# Patient Record
Sex: FEMALE | Race: WHITE | NOT HISPANIC OR LATINO | Employment: UNEMPLOYED | URBAN - METROPOLITAN AREA
[De-identification: names, ages, dates, MRNs, and addresses within clinical notes are randomized per-mention and may not be internally consistent; named-entity substitution may affect disease eponyms.]

---

## 2020-09-03 ENCOUNTER — OFFICE VISIT (OUTPATIENT)
Dept: OBSTETRICS AND GYNECOLOGY | Age: 40
End: 2020-09-03

## 2020-09-03 VITALS
BODY MASS INDEX: 50.02 KG/M2 | SYSTOLIC BLOOD PRESSURE: 126 MMHG | DIASTOLIC BLOOD PRESSURE: 82 MMHG | TEMPERATURE: 97.5 F | HEIGHT: 64 IN | WEIGHT: 293 LBS

## 2020-09-03 DIAGNOSIS — Z01.419 WELL WOMAN EXAM WITH ROUTINE GYNECOLOGICAL EXAM: Primary | ICD-10-CM

## 2020-09-03 DIAGNOSIS — N63.20 LEFT BREAST LUMP: ICD-10-CM

## 2020-09-03 PROCEDURE — 99386 PREV VISIT NEW AGE 40-64: CPT | Performed by: NURSE PRACTITIONER

## 2020-09-03 PROCEDURE — 99213 OFFICE O/P EST LOW 20 MIN: CPT | Performed by: NURSE PRACTITIONER

## 2020-09-03 RX ORDER — ALBUTEROL SULFATE 90 UG/1
2 AEROSOL, METERED RESPIRATORY (INHALATION) EVERY 4 HOURS PRN
COMMUNITY
End: 2020-09-04 | Stop reason: SDUPTHER

## 2020-09-03 RX ORDER — DICYCLOMINE HCL 20 MG
20 TABLET ORAL 2 TIMES DAILY
COMMUNITY
End: 2020-09-04 | Stop reason: SDUPTHER

## 2020-09-03 RX ORDER — SERTRALINE HYDROCHLORIDE 25 MG/1
25 TABLET, FILM COATED ORAL DAILY
COMMUNITY
End: 2020-09-04 | Stop reason: SDUPTHER

## 2020-09-03 RX ORDER — FLUCONAZOLE 150 MG/1
150 TABLET ORAL ONCE
Qty: 2 TABLET | Refills: 0 | Status: SHIPPED | OUTPATIENT
Start: 2020-09-03 | End: 2020-09-04

## 2020-09-03 RX ORDER — TOPIRAMATE 25 MG/1
25 CAPSULE, COATED PELLETS ORAL 2 TIMES DAILY
COMMUNITY
End: 2020-09-04 | Stop reason: SDUPTHER

## 2020-09-03 RX ORDER — LORATADINE 10 MG/1
CAPSULE, LIQUID FILLED ORAL
COMMUNITY
End: 2020-09-03

## 2020-09-03 RX ORDER — PANTOPRAZOLE SODIUM 20 MG/1
20 TABLET, DELAYED RELEASE ORAL DAILY
COMMUNITY
End: 2020-09-03

## 2020-09-03 RX ORDER — TRAZODONE HYDROCHLORIDE 100 MG/1
100 TABLET ORAL NIGHTLY
COMMUNITY
End: 2020-09-04 | Stop reason: SDUPTHER

## 2020-09-03 RX ORDER — ACETAMINOPHEN 325 MG/1
650 TABLET ORAL EVERY 6 HOURS PRN
COMMUNITY
End: 2020-09-03

## 2020-09-03 RX ORDER — SUMATRIPTAN 50 MG/1
50 TABLET, FILM COATED ORAL
COMMUNITY
End: 2020-09-04 | Stop reason: SDUPTHER

## 2020-09-03 NOTE — PROGRESS NOTES
[facility]  Routine Annual Visit    9/3/2020    Patient: Irish Garza          MR#:[id number]      History of Present Illness    40 y.o. female  who presents for annual exam as a new patient.    She reports very sporadic past GYN care. States last pap at least 2-3 years ago.  She denies hx of abnormals or significant GYN history other than 3 .  She denies any significant medical hx. Does struggle with IBS, headaches, obesity  Irish is using mirena for contraception- inserted last year she reports.  She is amenorrheic on the device and doing well    Irish complains today of intermittent left breast lump x years. She actually cannot locate today.   Denies skin changes, swelling, nipple changes/discharge bilaterally. No right breast lumps.    No other complaints today  She does desire comprehensive STI screening today.    Irish states she needs to get established with primary care. This was HIGHLY encouraged. Contact information for Dr. Kehrer's office given. She plans to call.   Advised she needs full physical and fasting blood work  Weight loss highly encouraged.      No LMP recorded. Patient has had an implant.  Obstetric History:  OB History        3    Para   3    Term   3            AB        Living   3       SAB        TAB        Ectopic        Molar        Multiple        Live Births   3               Menstrual History:     No LMP recorded. Patient has had an implant.       Sexual History:       ________________________________________  There is no problem list on file for this patient.      Past Medical History:   Diagnosis Date   • Anxiety    • Herpes    • IBS (irritable bowel syndrome)    • Major depressive disorder        Past Surgical History:   Procedure Laterality Date   • COLONOSCOPY     • ENDOSCOPY         Social History     Tobacco Use   Smoking Status Former Smoker   Smokeless Tobacco Never Used       Family History   Problem Relation Age of Onset   •  Thyroid disease Mother    • ADD / ADHD Brother    • Thyroid disease Sister    • Breast cancer Maternal Grandmother    • Lung cancer Maternal Grandmother        Prior to Admission medications    Medication Sig Start Date End Date Taking? Authorizing Provider   dicyclomine (BENTYL) 20 MG tablet Take 20 mg by mouth 2 (two) times a day.   Yes Sameera Cifuentes MD   sertraline (ZOLOFT) 25 MG tablet Take 25 mg by mouth Daily. TAKE 5 TABLETS BY MOUTH AT BEDTIME   Yes Sameera Cifuentes MD   acetaminophen (TYLENOL) 325 MG tablet Take 650 mg by mouth Every 6 (Six) Hours As Needed for Mild Pain .    Sameera Cifuentes MD   albuterol sulfate  (90 Base) MCG/ACT inhaler Inhale 2 puffs Every 4 (Four) Hours As Needed for Wheezing.    Sameera Cifuentes MD   Loratadine 10 MG capsule Take  by mouth.    Sameera Cifuentes MD   pantoprazole (PROTONIX) 20 MG EC tablet Take 20 mg by mouth Daily.    Sameera Cifuentes MD   psyllium (METAMUCIL) 58.6 % packet Take 1 packet by mouth Daily.    Sameera Cifuentes MD   SUMAtriptan (IMITREX) 50 MG tablet Take 50 mg by mouth Every 2 (Two) Hours As Needed for Migraine. Take one tablet at onset of headache. May repeat dose one time in 2 hours if headache not relieved.    Sameera Cifuentes MD   topiramate (TOPAMAX) 25 MG capsule (sprinkle) Take 25 mg by mouth 2 (Two) Times a Day.    Sameera Cifuentes MD   traZODone (DESYREL) 100 MG tablet Take 100 mg by mouth Every Night.    Sameera Cifuentes MD     ________________________________________    The following portions of the patient's history were reviewed and updated as appropriate: allergies, current medications, past family history, past medical history, past social history, past surgical history and problem list.    Review of Systems   Constitutional: Negative.    HENT: Negative.    Eyes: Negative for visual disturbance.   Respiratory: Negative for cough, shortness of breath and wheezing.    Cardiovascular:  "Negative for chest pain, palpitations and leg swelling.   Gastrointestinal: Negative for abdominal distention, abdominal pain, blood in stool, constipation, diarrhea, nausea and vomiting.   Endocrine: Negative for cold intolerance and heat intolerance.   Genitourinary: Negative for difficulty urinating, dyspareunia, dysuria, frequency, genital sores, hematuria, menstrual problem, pelvic pain, urgency, vaginal bleeding, vaginal discharge and vaginal pain.   Musculoskeletal: Negative.    Skin: Negative.    Neurological: Negative for dizziness, weakness, light-headedness, numbness and headaches.   Hematological: Negative.    Psychiatric/Behavioral: Negative.    Breasts- see HPI    Objective   Physical Exam    /82   Temp 97.5 °F (36.4 °C)   Ht 162.6 cm (64\")   Wt (!) 143 kg (315 lb 12.8 oz)   Breastfeeding No   BMI 54.21 kg/m²    BP Readings from Last 3 Encounters:   09/03/20 126/82      Wt Readings from Last 3 Encounters:   09/03/20 (!) 143 kg (315 lb 12.8 oz)        BMI: Estimated body mass index is 54.21 kg/m² as calculated from the following:    Height as of this encounter: 162.6 cm (64\").    Weight as of this encounter: 143 kg (315 lb 12.8 oz).          General:   alert, appears stated age and cooperative   Heart: regular rate and rhythm, S1, S2 normal, no murmur, click, rub or gallop   Lungs: clear to auscultation bilaterally   Abdomen: soft, non-tender, without masses or organomegaly   Breast: inspection negative, no nipple discharge or bleeding, no masses or nodularity palpable   Vulva: normal   Vagina: normal mucosa, normal discharge   Cervix: no cervical motion tenderness and no lesions   Uterus: normal size, mobile, non-tender, normal shape and consistency or exam is limited habitus    Adnexa: exam limited by habitus     Assessment:    1. Normal annual exam   2. Breast lump- diagnostic mammogram ordered and scheduled for patient prior to leaving office. She is highly encouraged to follow through " with this and now begin yearly screenings as 39 y/o. Follow up if breast symptoms persist even despite negative imaging.  3.  Counseled on healthy lifestyle modifications, safe sex, condom use, and self breast exams.    All of the patient's questions were addressed and answered, I have encouraged her to call for today's test results if she has not received them within 10 days.  Patient is advised to call with any change in her condition or with any other questions, otherwise return in 12 months for annual examination.      Plan:    []  Rx:   [x]  Mammogram request made  [x]  PAP done  []  Occult fecal blood test ([facility])  []  Labs:   [x]  GC/Chl/TV  []  DEXA scan   []  Referral for colonoscopy:           CHANNING Narayan  9/3/2020 16:19

## 2020-09-04 ENCOUNTER — OFFICE VISIT (OUTPATIENT)
Dept: FAMILY MEDICINE CLINIC | Facility: CLINIC | Age: 40
End: 2020-09-04

## 2020-09-04 VITALS
TEMPERATURE: 96.9 F | DIASTOLIC BLOOD PRESSURE: 74 MMHG | HEIGHT: 64 IN | SYSTOLIC BLOOD PRESSURE: 128 MMHG | BODY MASS INDEX: 50.02 KG/M2 | HEART RATE: 84 BPM | OXYGEN SATURATION: 99 % | WEIGHT: 293 LBS

## 2020-09-04 DIAGNOSIS — F41.9 ANXIETY: ICD-10-CM

## 2020-09-04 DIAGNOSIS — F51.01 PRIMARY INSOMNIA: ICD-10-CM

## 2020-09-04 DIAGNOSIS — F33.1 MODERATE EPISODE OF RECURRENT MAJOR DEPRESSIVE DISORDER (HCC): ICD-10-CM

## 2020-09-04 DIAGNOSIS — G43.009 MIGRAINE WITHOUT AURA AND WITHOUT STATUS MIGRAINOSUS, NOT INTRACTABLE: ICD-10-CM

## 2020-09-04 DIAGNOSIS — K58.2 IRRITABLE BOWEL SYNDROME WITH BOTH CONSTIPATION AND DIARRHEA: Primary | ICD-10-CM

## 2020-09-04 DIAGNOSIS — E66.01 CLASS 3 SEVERE OBESITY WITH SERIOUS COMORBIDITY AND BODY MASS INDEX (BMI) OF 50.0 TO 59.9 IN ADULT, UNSPECIFIED OBESITY TYPE (HCC): ICD-10-CM

## 2020-09-04 DIAGNOSIS — R10.11 RIGHT UPPER QUADRANT ABDOMINAL PAIN: ICD-10-CM

## 2020-09-04 LAB
HBV SURFACE AG SERPL QL IA: NEGATIVE
HCV AB S/CO SERPL IA: <0.1 S/CO RATIO (ref 0–0.9)
HIV 1+2 AB+HIV1 P24 AG SERPL QL IA: NON REACTIVE
RPR SER QL: NON REACTIVE

## 2020-09-04 PROCEDURE — 99203 OFFICE O/P NEW LOW 30 MIN: CPT | Performed by: NURSE PRACTITIONER

## 2020-09-04 RX ORDER — SERTRALINE HYDROCHLORIDE 25 MG/1
TABLET, FILM COATED ORAL
Qty: 150 TABLET | Refills: 2 | Status: SHIPPED | OUTPATIENT
Start: 2020-09-04 | End: 2020-09-04

## 2020-09-04 RX ORDER — TOPIRAMATE 25 MG/1
CAPSULE, COATED PELLETS ORAL
Qty: 120 CAPSULE | Refills: 0 | Status: SHIPPED | OUTPATIENT
Start: 2020-09-04 | End: 2020-10-22

## 2020-09-04 RX ORDER — DICYCLOMINE HCL 20 MG
20 TABLET ORAL 2 TIMES DAILY
Qty: 60 TABLET | Refills: 2 | Status: SHIPPED | OUTPATIENT
Start: 2020-09-04 | End: 2021-03-18

## 2020-09-04 RX ORDER — SUMATRIPTAN 50 MG/1
TABLET, FILM COATED ORAL
Qty: 9 TABLET | Refills: 2 | Status: SHIPPED | OUTPATIENT
Start: 2020-09-04 | End: 2020-09-10 | Stop reason: SDUPTHER

## 2020-09-04 RX ORDER — SERTRALINE HYDROCHLORIDE 25 MG/1
TABLET, FILM COATED ORAL
Qty: 150 TABLET | Refills: 2 | Status: SHIPPED | OUTPATIENT
Start: 2020-09-04 | End: 2020-11-16

## 2020-09-04 RX ORDER — ALBUTEROL SULFATE 90 UG/1
2 AEROSOL, METERED RESPIRATORY (INHALATION) EVERY 4 HOURS PRN
Qty: 8 G | Refills: 5 | Status: SHIPPED | OUTPATIENT
Start: 2020-09-04 | End: 2021-03-18

## 2020-09-04 RX ORDER — TRAZODONE HYDROCHLORIDE 100 MG/1
100 TABLET ORAL NIGHTLY
Qty: 30 TABLET | Refills: 2 | Status: SHIPPED | OUTPATIENT
Start: 2020-09-04 | End: 2021-03-18 | Stop reason: SDUPTHER

## 2020-09-04 RX ORDER — PANTOPRAZOLE SODIUM 40 MG/1
40 TABLET, DELAYED RELEASE ORAL DAILY
Qty: 30 TABLET | Refills: 2 | Status: SHIPPED | OUTPATIENT
Start: 2020-09-04 | End: 2021-03-18 | Stop reason: SDUPTHER
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
## 2020-09-04 NOTE — PATIENT INSTRUCTIONS
Depression Screening  Depression screening is a tool that your health care provider can use to learn if you have symptoms of depression. Depression is a common condition with many symptoms that are also often found in other conditions. Depression is treatable, but it must first be diagnosed. You may not know that certain feelings, thoughts, and behaviors that you are having can be symptoms of depression. Taking a depression screening test can help you and your health care provider decide if you need more assessment, or if you should be referred to a mental health care provider.  What are the screening tests?  · You may have a physical exam to see if another condition is affecting your mental health. You may have a blood or urine sample taken during the physical exam.  · You may be interviewed using a screening tool that was developed from research, such as one of these:  ? Patient Health Questionnaire (PHQ). This is a set of either 2 or 9 questions. A health care provider who has been trained to score this screening test uses a guide to assess if your symptoms suggest that you may have depression.  ? Armando Depression Rating Scale (HAM-D). This is a set of either 17 or 24 questions. You may be asked to take it again during or after your treatment, to see if your depression has gotten better.  ? Haley Depression Inventory (BDI). This is a set of 21 multiple choice questions. Your health care provider scores your answers to assess:  § Your level of depression, ranging from mild to severe.  § Your response to treatment.  · Your health care provider may talk with you about your daily activities, such as eating, sleeping, work, and recreation, and ask if you have had any changes in activity.  · Your health care provider may ask you to see a mental health specialist, such as a psychiatrist or psychologist, for more evaluation.  Who should be screened for depression?    · All adults, including adults with a family history  of a mental health disorder.  · Adolescents who are 12-18 years old.  · People who are recovering from a myocardial infarction (MI).  · Pregnant women, or women who have given birth.  · People who have a long-term (chronic) illness.  · Anyone who has been diagnosed with another type of a mental health disorder.  · Anyone who has symptoms that could show depression.  What do my results mean?  Your health care provider will review the results of your depression screening, physical exam, and lab tests. Positive screens suggest that you may have depression. Screening is the first step in getting the care that you may need. It is up to you to get your screening results. Ask your health care provider, or the department that is doing your screening tests, when your results will be ready. Talk with your health care provider about your results and diagnosis.  A diagnosis of depression is made using the Diagnostic and Statistical Manual of Mental Disorders (DSM-V). This is a book that lists the number and type of symptoms that must be present for a health care provider to give a specific diagnosis.   Your health care provider may work with you to treat your symptoms of depression, or your health care provider may help you find a mental health provider who can assess, diagnose, and treat your depression.  Get help right away if:  · You have thoughts about hurting yourself or others.  If you ever feel like you may hurt yourself or others, or have thoughts about taking your own life, get help right away. You can go to your nearest emergency department or call:  · Your local emergency services (911 in the [place]  · A suicide crisis helpline, such as the National Suicide Prevention Lifeline at 1-849.262.7828. This is open 24 hours a day.  Summary  · Depression screening is the first step in getting the help that you may need.  · If your screening test shows symptoms of depression (is positive), your health care provider may ask  you to see a mental health provider.  · Anyone who is age 12 or older should be screened for depression.  This information is not intended to replace advice given to you by your health care provider. Make sure you discuss any questions you have with your health care provider.  Document Released: 05/04/2018 Document Revised: 11/30/2018 Document Reviewed: 05/04/2018  Elsevier Patient Education © 2020 [employer]  Migraine Headache  A migraine headache is a very strong throbbing pain on one side or both sides of your head. This type of headache can also cause other symptoms. It can last from 4 hours to 3 days. Talk with your doctor about what things may bring on (trigger) this condition.  What are the causes?  The exact cause of this condition is not known. This condition may be triggered or caused by:  · Drinking alcohol.  · Smoking.  · Taking medicines, such as:  ? Medicine used to treat chest pain (nitroglycerin).  ? Birth control pills.  ? Estrogen.  ? Some blood pressure medicines.  · Eating or drinking certain products.  · Doing physical activity.  Other things that may trigger a migraine headache include:  · Having a menstrual period.  · Pregnancy.  · Hunger.  · Stress.  · Not getting enough sleep or getting too much sleep.  · Weather changes.  · Tiredness (fatigue).  What increases the risk?  · Being 25-55 years old.  · Being female.  · Having a family history of migraine headaches.  · Being .  · Having depression or anxiety.  · Being very overweight.  What are the signs or symptoms?  · A throbbing pain. This pain may:  ? Happen in any area of the head, such as on one side or both sides.  ? Make it hard to do daily activities.  ? Get worse with physical activity.  ? Get worse around bright lights or loud noises.  · Other symptoms may include:  ? Feeling sick to your stomach (nauseous).  ? Vomiting.  ? Dizziness.  ? Being sensitive to bright lights, loud noises, or smells.  · Before you get a migraine  headache, you may get warning signs (an aura). An aura may include:  ? Seeing flashing lights or having blind spots.  ? Seeing bright spots, halos, or zigzag lines.  ? Having tunnel vision or blurred vision.  ? Having numbness or a tingling feeling.  ? Having trouble talking.  ? Having weak muscles.  · Some people have symptoms after a migraine headache (postdromal phase), such as:  ? Tiredness.  ? Trouble thinking (concentrating).  How is this treated?  · Taking medicines that:  ? Relieve pain.  ? Relieve the feeling of being sick to your stomach.  ? Prevent migraine headaches.  · Treatment may also include:  ? Having acupuncture.  ? Avoiding foods that bring on migraine headaches.  ? Learning ways to control your body functions (biofeedback).  ? Therapy to help you know and deal with negative thoughts (cognitive behavioral therapy).  Follow these instructions at home:  Medicines  · Take over-the-counter and prescription medicines only as told by your doctor.  · Ask your doctor if the medicine prescribed to you:  ? Requires you to avoid driving or using heavy machinery.  ? Can cause trouble pooping (constipation). You may need to take these steps to prevent or treat trouble pooping:  § Drink enough fluid to keep your pee (urine) pale yellow.  § Take over-the-counter or prescription medicines.  § Eat foods that are high in fiber. These include beans, whole grains, and fresh fruits and vegetables.  § Limit foods that are high in fat and sugar. These include fried or sweet foods.  Lifestyle  · Do not drink alcohol.  · Do not use any products that contain nicotine or tobacco, such as cigarettes, e-cigarettes, and chewing tobacco. If you need help quitting, ask your doctor.  · Get at least 8 hours of sleep every night.  · Limit and deal with stress.  General instructions         · Keep a journal to find out what may bring on your migraine headaches. For example, write down:  ? What you eat and drink.  ? How much sleep  you get.  ? Any change in what you eat or drink.  ? Any change in your medicines.  · If you have a migraine headache:  ? Avoid things that make your symptoms worse, such as bright lights.  ? It may help to lie down in a dark, quiet room.  ? Do not drive or use heavy machinery.  ? Ask your doctor what activities are safe for you.  · Keep all follow-up visits as told by your doctor. This is important.  Contact a doctor if:  · You get a migraine headache that is different or worse than others you have had.  · You have more than 15 headache days in one month.  Get help right away if:  · Your migraine headache gets very bad.  · Your migraine headache lasts longer than 72 hours.  · You have a fever.  · You have a stiff neck.  · You have trouble seeing.  · Your muscles feel weak or like you cannot control them.  · You start to lose your balance a lot.  · You start to have trouble walking.  · You pass out (faint).  · You have a seizure.  Summary  · A migraine headache is a very strong throbbing pain on one side or both sides of your head. These headaches can also cause other symptoms.  · This condition may be treated with medicines and changes to your lifestyle.  · Keep a journal to find out what may bring on your migraine headaches.  · Contact a doctor if you get a migraine headache that is different or worse than others you have had.  · Contact your doctor if you have more than 15 headache days in a month.  This information is not intended to replace advice given to you by your health care provider. Make sure you discuss any questions you have with your health care provider.  Document Released: 09/26/2009 Document Revised: 04/10/2020 Document Reviewed: 01/30/2020  Elsevier Patient Education © 2020 [employer] Inc[employer]

## 2020-09-05 LAB
25(OH)D3+25(OH)D2 SERPL-MCNC: 29.3 NG/ML (ref 30–100)
ALBUMIN SERPL-MCNC: 4.5 G/DL (ref 3.8–4.8)
ALBUMIN/GLOB SERPL: 1.6 {RATIO} (ref 1.2–2.2)
ALP SERPL-CCNC: 76 IU/L (ref 39–117)
ALT SERPL-CCNC: 19 IU/L (ref 0–32)
AST SERPL-CCNC: 21 IU/L (ref 0–40)
BASOPHILS # BLD AUTO: 0.1 X10E3/UL (ref 0–0.2)
BASOPHILS NFR BLD AUTO: 1 %
BILIRUB SERPL-MCNC: 0.4 MG/DL (ref 0–1.2)
BUN SERPL-MCNC: 14 MG/DL (ref 6–24)
BUN/CREAT SERPL: 18 (ref 9–23)
C TRACH RRNA SPEC QL NAA+PROBE: NEGATIVE
CALCIUM SERPL-MCNC: 9.5 MG/DL (ref 8.7–10.2)
CHLORIDE SERPL-SCNC: 103 MMOL/L (ref 96–106)
CHOLEST SERPL-MCNC: 172 MG/DL (ref 100–199)
CO2 SERPL-SCNC: 23 MMOL/L (ref 20–29)
CREAT SERPL-MCNC: 0.78 MG/DL (ref 0.57–1)
EOSINOPHIL # BLD AUTO: 0.1 X10E3/UL (ref 0–0.4)
EOSINOPHIL NFR BLD AUTO: 1 %
ERYTHROCYTE [DISTWIDTH] IN BLOOD BY AUTOMATED COUNT: 13.6 % (ref 11.7–15.4)
GLOBULIN SER CALC-MCNC: 2.9 G/DL (ref 1.5–4.5)
GLUCOSE SERPL-MCNC: 101 MG/DL (ref 65–99)
HCT VFR BLD AUTO: 35.5 % (ref 34–46.6)
HDLC SERPL-MCNC: 41 MG/DL
HGB BLD-MCNC: 11.9 G/DL (ref 11.1–15.9)
IMM GRANULOCYTES # BLD AUTO: 0 X10E3/UL (ref 0–0.1)
IMM GRANULOCYTES NFR BLD AUTO: 0 %
LDLC SERPL CALC-MCNC: 104 MG/DL (ref 0–99)
LYMPHOCYTES # BLD AUTO: 2.9 X10E3/UL (ref 0.7–3.1)
LYMPHOCYTES NFR BLD AUTO: 26 %
MCH RBC QN AUTO: 28.5 PG (ref 26.6–33)
MCHC RBC AUTO-ENTMCNC: 33.5 G/DL (ref 31.5–35.7)
MCV RBC AUTO: 85 FL (ref 79–97)
MONOCYTES # BLD AUTO: 0.7 X10E3/UL (ref 0.1–0.9)
MONOCYTES NFR BLD AUTO: 6 %
N GONORRHOEA RRNA SPEC QL NAA+PROBE: NEGATIVE
NEUTROPHILS # BLD AUTO: 7.2 X10E3/UL (ref 1.4–7)
NEUTROPHILS NFR BLD AUTO: 66 %
PLATELET # BLD AUTO: 315 X10E3/UL (ref 150–450)
POTASSIUM SERPL-SCNC: 4 MMOL/L (ref 3.5–5.2)
PROT SERPL-MCNC: 7.4 G/DL (ref 6–8.5)
RBC # BLD AUTO: 4.17 X10E6/UL (ref 3.77–5.28)
SODIUM SERPL-SCNC: 141 MMOL/L (ref 134–144)
T VAGINALIS DNA SPEC QL NAA+PROBE: NEGATIVE
TRIGL SERPL-MCNC: 150 MG/DL (ref 0–149)
TSH SERPL DL<=0.005 MIU/L-ACNC: 1.32 UIU/ML (ref 0.45–4.5)
VIT B12 SERPL-MCNC: 396 PG/ML (ref 232–1245)
VLDLC SERPL CALC-MCNC: 27 MG/DL (ref 5–40)
WBC # BLD AUTO: 11 X10E3/UL (ref 3.4–10.8)

## 2020-09-08 ENCOUNTER — TELEPHONE (OUTPATIENT)
Dept: OBSTETRICS AND GYNECOLOGY | Age: 40
End: 2020-09-08

## 2020-09-08 LAB
CYTOLOGIST CVX/VAG CYTO: NORMAL
CYTOLOGY CVX/VAG DOC CYTO: NORMAL
CYTOLOGY CVX/VAG DOC THIN PREP: NORMAL
DX ICD CODE: NORMAL
HIV 1 & 2 AB SER-IMP: NORMAL
HPV I/H RISK 4 DNA CVX QL PROBE+SIG AMP: NEGATIVE
Lab: NORMAL
OTHER STN SPEC: NORMAL
STAT OF ADQ CVX/VAG CYTO-IMP: NORMAL

## 2020-09-08 NOTE — TELEPHONE ENCOUNTER
----- Message from CHANNING Ramirez sent at 9/8/2020  2:57 PM EDT -----  Please inform patient her pap smear returned negative (normal) and STI screening negative. Thank you.

## 2020-09-09 ENCOUNTER — TELEPHONE (OUTPATIENT)
Dept: FAMILY MEDICINE CLINIC | Facility: CLINIC | Age: 40
End: 2020-09-09

## 2020-09-09 ENCOUNTER — TELEPHONE (OUTPATIENT)
Dept: OBSTETRICS AND GYNECOLOGY | Age: 40
End: 2020-09-09

## 2020-09-09 ENCOUNTER — HOSPITAL ENCOUNTER (OUTPATIENT)
Dept: ULTRASOUND IMAGING | Facility: HOSPITAL | Age: 40
Discharge: HOME | End: 2020-09-09

## 2020-09-09 ENCOUNTER — HOSPITAL ENCOUNTER (OUTPATIENT)
Dept: MAMMOGRAPHY | Facility: HOSPITAL | Age: 40
Discharge: HOME | End: 2020-09-09

## 2020-09-09 DIAGNOSIS — N63.20 LEFT BREAST LUMP: ICD-10-CM

## 2020-09-09 PROCEDURE — 77066 DX MAMMO INCL CAD BI: CPT

## 2020-09-09 PROCEDURE — 76642 ULTRASOUND BREAST LIMITED: CPT

## 2020-09-09 NOTE — TELEPHONE ENCOUNTER
----- Message from CHANNING Ramirez sent at 9/9/2020  2:45 PM EDT -----  Please notify patient her mammogram and US returned negative. If she would like further evaluation with breast surgery let me know. Otherwise repeat mammogram 1 year.

## 2020-09-09 NOTE — TELEPHONE ENCOUNTER
Pt called in and stated that her medicine wasn't covered for the Headache medicine, Pharmacy told her that her insurance will only cover 3 at a time but if you increased the dose she could break them in half and use them as needed that way. Told her I'd send you a message and let her know your response tomorrow. I also advised pt about how the PA process works since she had another medicine that the pharmacy said isn't covered.

## 2020-09-10 ENCOUNTER — TELEPHONE (OUTPATIENT)
Dept: FAMILY MEDICINE CLINIC | Facility: CLINIC | Age: 40
End: 2020-09-10

## 2020-09-10 RX ORDER — SUMATRIPTAN 100 MG/1
TABLET, FILM COATED ORAL
Qty: 3 TABLET | Refills: 2 | Status: SHIPPED | OUTPATIENT
Start: 2020-09-10 | End: 2020-09-11 | Stop reason: SDUPTHER

## 2020-09-10 NOTE — TELEPHONE ENCOUNTER
Patient aware of med change, and she will call if there are any discrepencies at the pharmacy after med change.

## 2020-09-10 NOTE — TELEPHONE ENCOUNTER
IMITREX 100MG WAS SENT IN WITH DIRECTIONS TO TAKE HALF A TABLET AT THE ONSET OF HEADACHE THEN REPEAT IN TWO HOURS. THEY CAN'T BREAK THOSE TABLETS, SO THE PHARMACY IS WANTING TO KNOW IF WE CAN SEND IN THE 50MG DOSE TABLETS AND THEN DIRECTIONS BE TAKE ONE TABLET BY MOUTH AT THE ONSET OF THE HEADACHE AND THEN REPEAT.

## 2020-09-10 NOTE — TELEPHONE ENCOUNTER
I spoke with Mp. She stated that she apologizes for the confusion. Mp just spoke with a pharmacist who stated you can't break the tablets in half due to an ingredient in the tablets. Mp isn't sure who the patient spoke with at the pharmacy and told her that.

## 2020-09-10 NOTE — TELEPHONE ENCOUNTER
Please see Sumi's previous message's regarding this matter with instruction's from pharmacy instructing me to do this and please clarify with pharmacy what they would like me to do.  I'm sorry for the confusion.

## 2020-09-11 RX ORDER — SUMATRIPTAN 50 MG/1
TABLET, FILM COATED ORAL
Qty: 9 TABLET | Refills: 2 | Status: SHIPPED | OUTPATIENT
Start: 2020-09-11 | End: 2021-03-18 | Stop reason: SDUPTHER

## 2020-09-17 ENCOUNTER — APPOINTMENT (OUTPATIENT)
Dept: CT IMAGING | Facility: HOSPITAL | Age: 40
End: 2020-09-17

## 2020-09-28 ENCOUNTER — TELEPHONE (OUTPATIENT)
Dept: FAMILY MEDICINE CLINIC | Facility: CLINIC | Age: 40
End: 2020-09-28

## 2020-09-28 NOTE — TELEPHONE ENCOUNTER
PATIENT HAD AN APPT ON 9/18/20 FOR A CT OF THE ABDOMEN AND PELVIS. THAT APPOINTMENT WAS CANCELLED. I HAVE TRIED TO CALL THE PATIENT TO SEE IF SHE PLANS ON R/S THAT APPOINTMENT OR NOT. I DID HAVE TO LEAVE A MESSAGE FOR THE PATIENT.

## 2020-10-22 ENCOUNTER — TELEPHONE (OUTPATIENT)
Dept: FAMILY MEDICINE CLINIC | Facility: CLINIC | Age: 40
End: 2020-10-22

## 2020-10-22 RX ORDER — TOPIRAMATE 50 MG/1
50 TABLET, FILM COATED ORAL 2 TIMES DAILY
Qty: 60 TABLET | Refills: 2 | Status: SHIPPED | OUTPATIENT
Start: 2020-10-22 | End: 2021-03-18 | Stop reason: SDUPTHER

## 2020-10-22 NOTE — TELEPHONE ENCOUNTER
Insurance does not cover the Topiramate 25mg SPRINKLE caps, can we change to the regular Topiramate 25mg tablets. If so can you please send it in.

## 2020-11-16 ENCOUNTER — TELEPHONE (OUTPATIENT)
Dept: FAMILY MEDICINE CLINIC | Facility: CLINIC | Age: 40
End: 2020-11-16

## 2020-11-16 RX ORDER — SERTRALINE HYDROCHLORIDE 25 MG/1
TABLET, FILM COATED ORAL
Qty: 30 TABLET | Refills: 2 | Status: SHIPPED | OUTPATIENT
Start: 2020-11-16 | End: 2021-03-18 | Stop reason: SDUPTHER

## 2020-11-16 RX ORDER — SERTRALINE HYDROCHLORIDE 100 MG/1
TABLET, FILM COATED ORAL
Qty: 30 TABLET | Refills: 2 | Status: SHIPPED | OUTPATIENT
Start: 2020-11-16 | End: 2021-03-18 | Stop reason: SDUPTHER

## 2020-11-16 NOTE — TELEPHONE ENCOUNTER
ON THE ZOLOFT, THE INSTRUCTIONS ARE TAKE 5 TABLETS BY MOUTH DAILY. IS IT POSSIBLE TO CHANGE IT TO THE 100MG TABLETS AND THE 25MG TABLETS? TAKE ONE TABLET OF EACH DAILY TO EQUAL 125MG? INSURANCE WILL NOT PAY FOR THE 5 TABLETS OF 25MG.

## 2021-03-18 ENCOUNTER — OFFICE VISIT (OUTPATIENT)
Dept: FAMILY MEDICINE CLINIC | Facility: CLINIC | Age: 41
End: 2021-03-18

## 2021-03-18 VITALS
TEMPERATURE: 96.2 F | WEIGHT: 293 LBS | BODY MASS INDEX: 50.02 KG/M2 | SYSTOLIC BLOOD PRESSURE: 128 MMHG | DIASTOLIC BLOOD PRESSURE: 88 MMHG | HEART RATE: 86 BPM | OXYGEN SATURATION: 99 % | HEIGHT: 64 IN

## 2021-03-18 DIAGNOSIS — G43.009 MIGRAINE WITHOUT AURA AND WITHOUT STATUS MIGRAINOSUS, NOT INTRACTABLE: ICD-10-CM

## 2021-03-18 DIAGNOSIS — F51.01 PRIMARY INSOMNIA: ICD-10-CM

## 2021-03-18 DIAGNOSIS — F41.9 ANXIETY: ICD-10-CM

## 2021-03-18 DIAGNOSIS — K58.2 IRRITABLE BOWEL SYNDROME WITH BOTH CONSTIPATION AND DIARRHEA: Primary | ICD-10-CM

## 2021-03-18 DIAGNOSIS — F33.1 MODERATE EPISODE OF RECURRENT MAJOR DEPRESSIVE DISORDER (HCC): ICD-10-CM

## 2021-03-18 PROCEDURE — 99214 OFFICE O/P EST MOD 30 MIN: CPT | Performed by: NURSE PRACTITIONER

## 2021-03-18 RX ORDER — PANTOPRAZOLE SODIUM 40 MG/1
40 TABLET, DELAYED RELEASE ORAL DAILY
Qty: 30 TABLET | Refills: 2 | Status: SHIPPED | OUTPATIENT
Start: 2021-03-18 | End: 2021-09-16

## 2021-03-18 RX ORDER — SERTRALINE HYDROCHLORIDE 25 MG/1
TABLET, FILM COATED ORAL
Qty: 30 TABLET | Refills: 2 | Status: SHIPPED | OUTPATIENT
Start: 2021-03-18 | End: 2021-07-20 | Stop reason: SDUPTHER

## 2021-03-18 RX ORDER — SUMATRIPTAN 50 MG/1
TABLET, FILM COATED ORAL
Qty: 9 TABLET | Refills: 2 | Status: SHIPPED | OUTPATIENT
Start: 2021-03-18 | End: 2021-09-16

## 2021-03-18 RX ORDER — TOPIRAMATE 50 MG/1
50 TABLET, FILM COATED ORAL 2 TIMES DAILY
Qty: 60 TABLET | Refills: 2 | Status: SHIPPED | OUTPATIENT
Start: 2021-03-18 | End: 2021-08-04 | Stop reason: SDUPTHER

## 2021-03-18 RX ORDER — SERTRALINE HYDROCHLORIDE 100 MG/1
TABLET, FILM COATED ORAL
Qty: 30 TABLET | Refills: 2 | Status: SHIPPED | OUTPATIENT
Start: 2021-03-18 | End: 2021-07-20 | Stop reason: SDUPTHER

## 2021-03-18 RX ORDER — HYOSCYAMINE SULFATE 0.125 MG
250 TABLET,DISINTEGRATING ORAL EVERY 6 HOURS PRN
Qty: 120 TABLET | Refills: 2 | Status: SHIPPED | OUTPATIENT
Start: 2021-03-18 | End: 2021-03-22

## 2021-03-18 RX ORDER — TRAZODONE HYDROCHLORIDE 100 MG/1
100 TABLET ORAL NIGHTLY
Qty: 30 TABLET | Refills: 2 | Status: SHIPPED | OUTPATIENT
Start: 2021-03-18 | End: 2021-08-18

## 2021-03-18 NOTE — PROGRESS NOTES
"Chief Complaint  Med Refill    Subjective          Irish Garza presents to [facility]  History of Present Illness       Patient is here today for follow up on chronic conditions.   I saw her in September as a new patient and gave her a couple refills on medication.  This is her first time following up with me.    IBS: symptoms seem to come and go.  She reports that pharmacy told her that insurance wouldn't cover bentyl and she wasn't going to pay out of pocket for it.    She reports the same thing for the pantoprazole.      ANXIETY/DEPRESSION: zoloft 125mg working well for mood and anxiety.   She reports she still has it.      MIGRAINES: topamax 50mg daily and then had imitrex as needed. States she would get headache once daily still, but she can function.  She reports she is out of topamax, but it is working well for migraines.  She needs refills on medication    INSOMNIA: trazodone 100mg nightly was working well for her.   She needs refills on this.       She also has a complaint of tooth cracked on her as well.  She took a bite of food yesterday.  Needs a dentist.  I gave her several numbers for her son earlier today.    Objective   Vital Signs:   /88   Pulse 86   Temp 96.2 °F (35.7 °C)   Ht 162.6 cm (64\")   Wt (!) 142 kg (314 lb)   SpO2 99%   BMI 53.90 kg/m²     Physical Exam  Constitutional:       Appearance: Normal appearance.   Cardiovascular:      Rate and Rhythm: Normal rate and regular rhythm.      Pulses: Normal pulses.   Pulmonary:      Effort: Pulmonary effort is normal.      Breath sounds: Normal breath sounds.   Neurological:      Mental Status: She is alert and oriented to person, place, and time.   Psychiatric:         Mood and Affect: Mood normal.         Behavior: Behavior normal.         Thought Content: Thought content normal.         Judgment: Judgment normal.        Result Review :                 Assessment and Plan    Diagnoses and all orders " for this visit:    1. Irritable bowel syndrome with both constipation and diarrhea (Primary)  -     CBC & Differential  -     Comprehensive Metabolic Panel  -     Lipid Panel  -     TSH  -     Vitamin B12  -     Vitamin D 25 Hydroxy    2. Anxiety  -     CBC & Differential  -     Comprehensive Metabolic Panel  -     Lipid Panel  -     TSH  -     Vitamin B12  -     Vitamin D 25 Hydroxy    3. Moderate episode of recurrent major depressive disorder (CMS/HCC)  -     CBC & Differential  -     Comprehensive Metabolic Panel  -     Lipid Panel  -     TSH  -     Vitamin B12  -     Vitamin D 25 Hydroxy    4. Migraine without aura and without status migrainosus, not intractable  -     CBC & Differential  -     Comprehensive Metabolic Panel  -     Lipid Panel  -     TSH  -     Vitamin B12  -     Vitamin D 25 Hydroxy    5. Primary insomnia  -     CBC & Differential  -     Comprehensive Metabolic Panel  -     Lipid Panel  -     TSH  -     Vitamin B12  -     Vitamin D 25 Hydroxy    Other orders  -     hyoscyamine sulfate (ANASPAZ) 0.125 MG tablet dispersible disintegrating tablet; Place 2 tablets on the tongue Every 6 (Six) Hours As Needed (abdominal discomfort).  Dispense: 120 tablet; Refill: 2  -     pantoprazole (PROTONIX) 40 MG EC tablet; Take 1 tablet by mouth Daily.  Dispense: 30 tablet; Refill: 2  -     sertraline (Zoloft) 100 MG tablet; Take one tablet by mouth daily along with 25mg tablet for total of 125mg daily.  Dispense: 30 tablet; Refill: 2  -     sertraline (Zoloft) 25 MG tablet; Take one tablet by mouth daily along with 100mg tablet for total of 125mg daily.  Dispense: 30 tablet; Refill: 2  -     SUMAtriptan (IMITREX) 50 MG tablet; Take 1/2 tablet at onset of headache. May repeat one time in 2 hours if headache not relieved.  Dispense: 9 tablet; Refill: 2  -     topiramate (TOPAMAX) 50 MG tablet; Take 1 tablet by mouth 2 (Two) Times a Day.  Dispense: 60 tablet; Refill: 2  -     traZODone (DESYREL) 100 MG tablet;  Take 1 tablet by mouth Every Night.  Dispense: 30 tablet; Refill: 2      IBS: will trial hyoscyamine as it appears to be covered by insurance. Refill on protonix.  If no improvement. Follow up    ANXIETY/DEPRESSION: zoloft 125mg working well for mood and anxiety.   Continue     MIGRAINES: topamax 50mg daily and then had imitrex as needed. States she would get headache once daily still, but she can function.  She reports she is out of topamax, but it is working well for migraines.  Refills given    INSOMNIA: trazodone 100mg nightly was working well for her.   Refills given    Rechecking labs today.  Will call with results.  If stable follow up in 6 months, sooner if needed. Patient verbalizes understanding.           Follow Up   Return in about 6 months (around 9/18/2021) for Annual physical.  Patient was given instructions and counseling regarding her condition or for health maintenance advice. Please see specific information pulled into the AVS if appropriate.

## 2021-03-19 LAB
25(OH)D3+25(OH)D2 SERPL-MCNC: 34.4 NG/ML (ref 30–100)
ALBUMIN SERPL-MCNC: 4.3 G/DL (ref 3.8–4.8)
ALBUMIN/GLOB SERPL: 1.2 {RATIO} (ref 1.2–2.2)
ALP SERPL-CCNC: 90 IU/L (ref 39–117)
ALT SERPL-CCNC: 18 IU/L (ref 0–32)
AST SERPL-CCNC: 18 IU/L (ref 0–40)
BASOPHILS # BLD AUTO: 0 X10E3/UL (ref 0–0.2)
BASOPHILS NFR BLD AUTO: 0 %
BILIRUB SERPL-MCNC: 0.3 MG/DL (ref 0–1.2)
BUN SERPL-MCNC: 12 MG/DL (ref 6–24)
BUN/CREAT SERPL: 17 (ref 9–23)
CALCIUM SERPL-MCNC: 9.4 MG/DL (ref 8.7–10.2)
CHLORIDE SERPL-SCNC: 102 MMOL/L (ref 96–106)
CHOLEST SERPL-MCNC: 163 MG/DL (ref 100–199)
CO2 SERPL-SCNC: 24 MMOL/L (ref 20–29)
CREAT SERPL-MCNC: 0.71 MG/DL (ref 0.57–1)
EOSINOPHIL # BLD AUTO: 0.1 X10E3/UL (ref 0–0.4)
EOSINOPHIL NFR BLD AUTO: 1 %
ERYTHROCYTE [DISTWIDTH] IN BLOOD BY AUTOMATED COUNT: 13.6 % (ref 11.7–15.4)
GLOBULIN SER CALC-MCNC: 3.5 G/DL (ref 1.5–4.5)
GLUCOSE SERPL-MCNC: 114 MG/DL (ref 65–99)
HCT VFR BLD AUTO: 36.7 % (ref 34–46.6)
HDLC SERPL-MCNC: 41 MG/DL
HGB BLD-MCNC: 12.1 G/DL (ref 11.1–15.9)
IMM GRANULOCYTES # BLD AUTO: 0.1 X10E3/UL (ref 0–0.1)
IMM GRANULOCYTES NFR BLD AUTO: 1 %
LDLC SERPL CALC-MCNC: 90 MG/DL (ref 0–99)
LYMPHOCYTES # BLD AUTO: 3.3 X10E3/UL (ref 0.7–3.1)
LYMPHOCYTES NFR BLD AUTO: 26 %
MCH RBC QN AUTO: 28 PG (ref 26.6–33)
MCHC RBC AUTO-ENTMCNC: 33 G/DL (ref 31.5–35.7)
MCV RBC AUTO: 85 FL (ref 79–97)
MONOCYTES # BLD AUTO: 0.6 X10E3/UL (ref 0.1–0.9)
MONOCYTES NFR BLD AUTO: 5 %
NEUTROPHILS # BLD AUTO: 8.6 X10E3/UL (ref 1.4–7)
NEUTROPHILS NFR BLD AUTO: 67 %
PLATELET # BLD AUTO: 320 X10E3/UL (ref 150–450)
POTASSIUM SERPL-SCNC: 4.1 MMOL/L (ref 3.5–5.2)
PROT SERPL-MCNC: 7.8 G/DL (ref 6–8.5)
RBC # BLD AUTO: 4.32 X10E6/UL (ref 3.77–5.28)
SODIUM SERPL-SCNC: 142 MMOL/L (ref 134–144)
TRIGL SERPL-MCNC: 184 MG/DL (ref 0–149)
TSH SERPL DL<=0.005 MIU/L-ACNC: 1.37 UIU/ML (ref 0.45–4.5)
VIT B12 SERPL-MCNC: 477 PG/ML (ref 232–1245)
VLDLC SERPL CALC-MCNC: 32 MG/DL (ref 5–40)
WBC # BLD AUTO: 12.7 X10E3/UL (ref 3.4–10.8)

## 2021-03-20 LAB
HBA1C MFR BLD: 5.4 % (ref 4.8–5.6)
Lab: NORMAL
WRITTEN AUTHORIZATION: NORMAL

## 2021-03-22 ENCOUNTER — TELEPHONE (OUTPATIENT)
Dept: FAMILY MEDICINE CLINIC | Facility: CLINIC | Age: 41
End: 2021-03-22

## 2021-03-22 RX ORDER — HYOSCYAMINE SULFATE 0.125 MG
250 TABLET ORAL EVERY 4 HOURS PRN
Qty: 120 TABLET | Refills: 2 | Status: SHIPPED | OUTPATIENT
Start: 2021-03-22 | End: 2021-04-01

## 2021-03-22 RX ORDER — AMOXICILLIN AND CLAVULANATE POTASSIUM 875; 125 MG/1; MG/1
1 TABLET, FILM COATED ORAL 2 TIMES DAILY
Qty: 20 TABLET | Refills: 0 | Status: SHIPPED | OUTPATIENT
Start: 2021-03-22

## 2021-03-22 NOTE — TELEPHONE ENCOUNTER
YOLANDA FROM [facility] STATES INSURANCE WONT PAY FOR SUBLINGUAL TABLETS ON THE hyoscyamine sulfate (ANASPAZ) 0.125 MG tablet dispersible disintegrating tablet. WOULD LIKE TO KNOW IF THIS CAN BE SWITCHED.    PLEASE ADVISE: [phone]

## 2021-03-23 ENCOUNTER — TELEPHONE (OUTPATIENT)
Dept: FAMILY MEDICINE CLINIC | Facility: CLINIC | Age: 41
End: 2021-03-23

## 2021-03-24 NOTE — TELEPHONE ENCOUNTER
In your last note you stated that insurance states that hyoscamine non translingual was covered.  Do you mind to check with pharmacy?  Thank you.  It says covered on the epic system.  Sorry about that.

## 2021-04-01 ENCOUNTER — TELEPHONE (OUTPATIENT)
Dept: FAMILY MEDICINE CLINIC | Facility: CLINIC | Age: 41
End: 2021-04-01

## 2021-04-01 RX ORDER — HYOSCYAMINE SULFATE 0.12 MG/ML
0.12 LIQUID ORAL EVERY 6 HOURS PRN
Qty: 120 ML | Refills: 2 | Status: SHIPPED | OUTPATIENT
Start: 2021-04-01

## 2021-04-01 NOTE — TELEPHONE ENCOUNTER
Patient called in and stated she cant afford ler levasin, it was $200. I advised her to use good rx @ [facility] it would be $7.25/ patient is aware

## 2021-04-01 NOTE — TELEPHONE ENCOUNTER
Caller: Irish Garza    Relationship: Self    Best call back number: [phone]    What medications are you currently taking:   Current Outpatient Medications on File Prior to Visit   Medication Sig Dispense Refill   • amoxicillin-clavulanate (AUGMENTIN) 875-125 MG per tablet Take 1 tablet by mouth 2 (Two) Times a Day. 20 tablet 0   • hyoscyamine (LEVSIN) 0.125 MG/ML solution Take 1 mL by mouth Every 6 (Six) Hours As Needed for Cramping. 120 mL 2   • levonorgestrel (Mirena, 52 MG,) 20 MCG/24HR IUD 1 each by Intrauterine route 1 (One) Time.     • pantoprazole (PROTONIX) 40 MG EC tablet Take 1 tablet by mouth Daily. 30 tablet 2   • psyllium (METAMUCIL) 58.6 % packet Take 1 packet by mouth Daily.     • sertraline (Zoloft) 100 MG tablet Take one tablet by mouth daily along with 25mg tablet for total of 125mg daily. 30 tablet 2   • sertraline (Zoloft) 25 MG tablet Take one tablet by mouth daily along with 100mg tablet for total of 125mg daily. 30 tablet 2   • SUMAtriptan (IMITREX) 50 MG tablet Take 1/2 tablet at onset of headache. May repeat one time in 2 hours if headache not relieved. 9 tablet 2   • topiramate (TOPAMAX) 50 MG tablet Take 1 tablet by mouth 2 (Two) Times a Day. 60 tablet 2   • traZODone (DESYREL) 100 MG tablet Take 1 tablet by mouth Every Night. 30 tablet 2   • [DISCONTINUED] hyoscyamine (ANASPAZ,LEVSIN) 0.125 MG tablet Take 2 tablets by mouth Every 4 (Four) Hours As Needed for Cramping. 120 tablet 2     No current facility-administered medications on file prior to visit.        Which medication are you concerned about: MEDICATION FOR STOMACH ISSUES    Who prescribed you this medication: MARGARET ROSS    What are your concerns: INSURANCE WON'T COVER THE MEDICATION PRESCRIBED    PLEASE CONTACT THE PATIENT TO DISCUSS THESE MEDICATIONS SO THAT THEY CAN GET IT STRAIGHTENED OUT WITH THE PHARMACY

## 2021-05-07 RX ORDER — DICYCLOMINE HCL 20 MG
TABLET ORAL
Qty: 60 TABLET | Refills: 1 | OUTPATIENT
Start: 2021-05-07

## 2021-07-20 ENCOUNTER — TELEPHONE (OUTPATIENT)
Dept: FAMILY MEDICINE CLINIC | Facility: CLINIC | Age: 41
End: 2021-07-20

## 2021-07-20 RX ORDER — DICYCLOMINE HCL 20 MG
20 TABLET ORAL EVERY 6 HOURS
Qty: 120 TABLET | Refills: 2 | Status: SHIPPED | OUTPATIENT
Start: 2021-07-20 | End: 2021-09-16

## 2021-07-20 RX ORDER — SERTRALINE HYDROCHLORIDE 100 MG/1
TABLET, FILM COATED ORAL
Qty: 30 TABLET | Refills: 2 | Status: SHIPPED | OUTPATIENT
Start: 2021-07-20 | End: 2021-12-09

## 2021-07-20 RX ORDER — SERTRALINE HYDROCHLORIDE 25 MG/1
TABLET, FILM COATED ORAL
Qty: 30 TABLET | Refills: 2 | Status: SHIPPED | OUTPATIENT
Start: 2021-07-20 | End: 2021-12-09

## 2021-07-20 NOTE — TELEPHONE ENCOUNTER
Caller: Irish Garza    Relationship: Self    Best call back number: [phone]    Medication needed:   Requested Prescriptions     Pending Prescriptions Disp Refills   • sertraline (Zoloft) 100 MG tablet 30 tablet 2     Sig: Take one tablet by mouth daily along with 25mg tablet for total of 125mg daily.   • sertraline (Zoloft) 25 MG tablet 30 tablet 2     Sig: Take one tablet by mouth daily along with 100mg tablet for total of 125mg daily.     DICYCLONINE 20 MG.        When do you need the refill by: ASAP     What additional details did the patient provide when requesting the medication: OUT OF MEDICATION     Does the patient have less than a 3 day supply:  [x] Yes  [] No    What is the patient's preferred pharmacy: [facility] [address] AT  [address] - [phone]  - [phone] FX

## 2021-08-04 RX ORDER — TOPIRAMATE 50 MG/1
50 TABLET, FILM COATED ORAL 2 TIMES DAILY
Qty: 60 TABLET | Refills: 2 | Status: SHIPPED | OUTPATIENT
Start: 2021-08-04 | End: 2021-10-13

## 2021-08-18 RX ORDER — TRAZODONE HYDROCHLORIDE 100 MG/1
TABLET ORAL
Qty: 30 TABLET | Refills: 1 | Status: SHIPPED | OUTPATIENT
Start: 2021-08-18 | End: 2021-11-01

## 2021-09-16 RX ORDER — PANTOPRAZOLE SODIUM 40 MG/1
40 TABLET, DELAYED RELEASE ORAL DAILY
Qty: 30 TABLET | Refills: 0 | Status: SHIPPED | OUTPATIENT
Start: 2021-09-16 | End: 2021-10-14

## 2021-09-16 RX ORDER — DICYCLOMINE HCL 20 MG
TABLET ORAL
Qty: 120 TABLET | Refills: 1 | Status: SHIPPED | OUTPATIENT
Start: 2021-09-16 | End: 2021-11-01

## 2021-09-16 RX ORDER — SUMATRIPTAN 50 MG/1
TABLET, FILM COATED ORAL
Qty: 9 TABLET | Refills: 0 | Status: SHIPPED | OUTPATIENT
Start: 2021-09-16 | End: 2021-10-14

## 2021-10-13 RX ORDER — TOPIRAMATE 50 MG/1
TABLET, FILM COATED ORAL
Qty: 60 TABLET | Refills: 0 | Status: SHIPPED | OUTPATIENT
Start: 2021-10-13 | End: 2021-11-01

## 2021-10-14 RX ORDER — SUMATRIPTAN 50 MG/1
TABLET, FILM COATED ORAL
Qty: 9 TABLET | Refills: 0 | Status: SHIPPED | OUTPATIENT
Start: 2021-10-14 | End: 2021-11-01

## 2021-10-14 RX ORDER — PANTOPRAZOLE SODIUM 40 MG/1
40 TABLET, DELAYED RELEASE ORAL DAILY
Qty: 30 TABLET | Refills: 0 | Status: SHIPPED | OUTPATIENT
Start: 2021-10-14 | End: 2021-11-01

## 2021-10-15 RX ORDER — HYOSCYAMINE SULFATE 0.125 MG
TABLET ORAL
Qty: 120 TABLET | Refills: 0 | Status: SHIPPED | OUTPATIENT
Start: 2021-10-15 | End: 2021-11-01

## 2021-11-01 RX ORDER — DICYCLOMINE HCL 20 MG
TABLET ORAL
Qty: 90 TABLET | Refills: 2 | Status: SHIPPED | OUTPATIENT
Start: 2021-11-01 | End: 2022-01-10

## 2021-11-01 RX ORDER — HYOSCYAMINE SULFATE 0.125 MG
TABLET ORAL
Qty: 120 TABLET | Refills: 10 | Status: SHIPPED | OUTPATIENT
Start: 2021-11-01

## 2021-11-01 RX ORDER — TRAZODONE HYDROCHLORIDE 100 MG/1
TABLET ORAL
Qty: 90 TABLET | Refills: 2 | Status: SHIPPED | OUTPATIENT
Start: 2021-11-01 | End: 2022-08-03

## 2021-11-01 RX ORDER — TOPIRAMATE 50 MG/1
TABLET, FILM COATED ORAL
Qty: 60 TABLET | Refills: 10 | Status: SHIPPED | OUTPATIENT
Start: 2021-11-01

## 2021-11-01 RX ORDER — PANTOPRAZOLE SODIUM 40 MG/1
40 TABLET, DELAYED RELEASE ORAL DAILY
Qty: 30 TABLET | Refills: 10 | Status: SHIPPED | OUTPATIENT
Start: 2021-11-01

## 2021-11-01 RX ORDER — SUMATRIPTAN 50 MG/1
TABLET, FILM COATED ORAL
Qty: 9 TABLET | Refills: 10 | Status: SHIPPED | OUTPATIENT
Start: 2021-11-01

## 2021-12-09 RX ORDER — SERTRALINE HYDROCHLORIDE 25 MG/1
TABLET, FILM COATED ORAL
Qty: 30 TABLET | Refills: 0 | Status: SHIPPED | OUTPATIENT
Start: 2021-12-09 | End: 2022-01-10

## 2021-12-09 RX ORDER — SERTRALINE HYDROCHLORIDE 100 MG/1
TABLET, FILM COATED ORAL
Qty: 30 TABLET | Refills: 0 | Status: SHIPPED | OUTPATIENT
Start: 2021-12-09 | End: 2022-01-10

## 2022-01-10 RX ORDER — DICYCLOMINE HCL 20 MG
TABLET ORAL
Qty: 120 TABLET | Refills: 0 | Status: SHIPPED | OUTPATIENT
Start: 2022-01-10 | End: 2022-02-04

## 2022-01-10 RX ORDER — SERTRALINE HYDROCHLORIDE 25 MG/1
TABLET, FILM COATED ORAL
Qty: 30 TABLET | Refills: 0 | Status: SHIPPED | OUTPATIENT
Start: 2022-01-10 | End: 2022-02-04

## 2022-01-10 RX ORDER — SERTRALINE HYDROCHLORIDE 100 MG/1
TABLET, FILM COATED ORAL
Qty: 30 TABLET | Refills: 0 | Status: SHIPPED | OUTPATIENT
Start: 2022-01-10 | End: 2022-02-04

## 2022-02-04 RX ORDER — SERTRALINE HYDROCHLORIDE 25 MG/1
TABLET, FILM COATED ORAL
Qty: 30 TABLET | Refills: 0 | Status: SHIPPED | OUTPATIENT
Start: 2022-02-04 | End: 2022-03-11 | Stop reason: SDUPTHER

## 2022-02-04 RX ORDER — SERTRALINE HYDROCHLORIDE 100 MG/1
TABLET, FILM COATED ORAL
Qty: 30 TABLET | Refills: 0 | Status: SHIPPED | OUTPATIENT
Start: 2022-02-04 | End: 2022-03-11 | Stop reason: SDUPTHER

## 2022-02-04 RX ORDER — DICYCLOMINE HCL 20 MG
TABLET ORAL
Qty: 120 TABLET | Refills: 0 | Status: SHIPPED | OUTPATIENT
Start: 2022-02-04 | End: 2022-03-11 | Stop reason: SDUPTHER

## 2022-02-07 NOTE — TELEPHONE ENCOUNTER
Spoke with patient and she is stating that she does not have any insurance at this time and not sure if she will get it back.  Pt is not sure what to do. Please advise

## 2022-02-09 NOTE — TELEPHONE ENCOUNTER
It will be a year next month since patient has been seen.  I will not be continuing refills without her coming in for complete physical.

## 2022-03-10 RX ORDER — SERTRALINE HYDROCHLORIDE 100 MG/1
TABLET, FILM COATED ORAL
Qty: 30 TABLET | Refills: 0 | OUTPATIENT
Start: 2022-03-10

## 2022-03-10 RX ORDER — DICYCLOMINE HCL 20 MG
TABLET ORAL
Qty: 120 TABLET | Refills: 0 | OUTPATIENT
Start: 2022-03-10

## 2022-03-10 RX ORDER — SERTRALINE HYDROCHLORIDE 25 MG/1
TABLET, FILM COATED ORAL
Qty: 30 TABLET | Refills: 0 | OUTPATIENT
Start: 2022-03-10

## 2022-03-10 NOTE — TELEPHONE ENCOUNTER
Patient needs follow-up for additional refills.  I told her this last month.  Needs complete physical.

## 2022-03-11 RX ORDER — DICYCLOMINE HCL 20 MG
20 TABLET ORAL EVERY 6 HOURS
Qty: 120 TABLET | Refills: 0 | Status: SHIPPED | OUTPATIENT
Start: 2022-03-11 | End: 2022-04-18

## 2022-03-11 RX ORDER — SERTRALINE HYDROCHLORIDE 25 MG/1
TABLET, FILM COATED ORAL
Qty: 30 TABLET | Refills: 0 | Status: SHIPPED | OUTPATIENT
Start: 2022-03-11

## 2022-03-11 RX ORDER — SERTRALINE HYDROCHLORIDE 100 MG/1
TABLET, FILM COATED ORAL
Qty: 30 TABLET | Refills: 0 | Status: SHIPPED | OUTPATIENT
Start: 2022-03-11 | End: 2022-05-25

## 2022-03-11 NOTE — TELEPHONE ENCOUNTER
Pt is stating that she did not make an appointment because she does not have insurance.  She lost her state coverage

## 2022-03-11 NOTE — TELEPHONE ENCOUNTER
I will give 1 more 30-day refill.  However it has been over a year since patient has been seen and she must make an appointment for additional refills.  Please advise her that she can pay cash price to be seen if she does not have insurance.

## 2022-04-18 RX ORDER — SERTRALINE HYDROCHLORIDE 100 MG/1
TABLET, FILM COATED ORAL
Qty: 30 TABLET | Refills: 10 | OUTPATIENT
Start: 2022-04-18

## 2022-04-18 RX ORDER — DICYCLOMINE HCL 20 MG
TABLET ORAL
Qty: 120 TABLET | Refills: 10 | Status: SHIPPED | OUTPATIENT
Start: 2022-04-18

## 2022-04-18 RX ORDER — SERTRALINE HYDROCHLORIDE 25 MG/1
TABLET, FILM COATED ORAL
Qty: 30 TABLET | Refills: 10 | OUTPATIENT
Start: 2022-04-18

## 2022-04-18 NOTE — TELEPHONE ENCOUNTER
Patient has not been seen since March 2021.  I told her last month that I would give her 1 more refill but would need to be seen for physical and evaluation for any additional refills.

## 2022-04-28 RX ORDER — SERTRALINE HYDROCHLORIDE 25 MG/1
TABLET, FILM COATED ORAL
Qty: 30 TABLET | Refills: 10 | OUTPATIENT
Start: 2022-04-28

## 2022-05-25 RX ORDER — SERTRALINE HYDROCHLORIDE 100 MG/1
TABLET, FILM COATED ORAL
Qty: 30 TABLET | Refills: 0 | Status: SHIPPED | OUTPATIENT
Start: 2022-05-25 | End: 2022-08-04

## 2022-05-25 NOTE — TELEPHONE ENCOUNTER
Patient has not been seen since March 2021.  No additional refills will be given until patient has appointment for complete physical.   Refill-last seen 11/14/19

## 2022-06-08 RX ORDER — SERTRALINE HYDROCHLORIDE 25 MG/1
TABLET, FILM COATED ORAL
Qty: 30 TABLET | Refills: 10 | OUTPATIENT
Start: 2022-06-08

## 2022-07-05 RX ORDER — SERTRALINE HYDROCHLORIDE 100 MG/1
TABLET, FILM COATED ORAL
Qty: 30 TABLET | Refills: 10 | OUTPATIENT
Start: 2022-07-05

## 2022-07-06 RX ORDER — SERTRALINE HYDROCHLORIDE 25 MG/1
TABLET, FILM COATED ORAL
Qty: 30 TABLET | Refills: 10 | OUTPATIENT
Start: 2022-07-06

## 2022-07-06 NOTE — TELEPHONE ENCOUNTER
Patient has not been seen since March 2021.  Needs appointment for physical for any additional refills

## 2022-07-21 RX ORDER — SERTRALINE HYDROCHLORIDE 100 MG/1
TABLET, FILM COATED ORAL
Qty: 30 TABLET | Refills: 0 | OUTPATIENT
Start: 2022-07-21

## 2022-07-21 NOTE — TELEPHONE ENCOUNTER
Patient has not been seen since March 2021.  We will not give refills until seen by provider for physical.

## 2022-08-03 RX ORDER — TRAZODONE HYDROCHLORIDE 100 MG/1
TABLET ORAL
Qty: 30 TABLET | Refills: 0 | Status: SHIPPED | OUTPATIENT
Start: 2022-08-03

## 2022-08-04 RX ORDER — SERTRALINE HYDROCHLORIDE 100 MG/1
TABLET, FILM COATED ORAL
Qty: 30 TABLET | Refills: 10 | Status: SHIPPED | OUTPATIENT
Start: 2022-08-04

## 2022-08-04 RX ORDER — SERTRALINE HYDROCHLORIDE 25 MG/1
TABLET, FILM COATED ORAL
Qty: 30 TABLET | Refills: 10 | OUTPATIENT
Start: 2022-08-04

## 2022-08-19 NOTE — TELEPHONE ENCOUNTER
Pt said that she has no insurance she lost it and cant work, I explained that when she gets a job and gets insurance to call us and make appt

## 2022-08-22 RX ORDER — TRAZODONE HYDROCHLORIDE 100 MG/1
TABLET ORAL
Qty: 30 TABLET | Refills: 10 | OUTPATIENT
Start: 2022-08-22

## 2022-08-31 RX ORDER — TRAZODONE HYDROCHLORIDE 100 MG/1
TABLET ORAL
Qty: 30 TABLET | Refills: 10 | OUTPATIENT
Start: 2022-08-31

## 2022-09-06 RX ORDER — TRAZODONE HYDROCHLORIDE 100 MG/1
TABLET ORAL
Qty: 30 TABLET | Refills: 10 | OUTPATIENT
Start: 2022-09-06

## 2022-09-15 RX ORDER — TRAZODONE HYDROCHLORIDE 100 MG/1
100 TABLET ORAL NIGHTLY
Qty: 30 TABLET | Refills: 0 | OUTPATIENT
Start: 2022-09-15

## 2022-09-15 NOTE — TELEPHONE ENCOUNTER
Caller: [facility], [place] - [address] - [phone] PH - [phone] FX    Relationship: Pharmacy    Best call back number: [phone]    Requested Prescriptions:   Requested Prescriptions     Pending Prescriptions Disp Refills   • traZODone (DESYREL) 100 MG tablet 30 tablet 0     Sig: Take 1 tablet by mouth Every Night.        Pharmacy where request should be sent: [facility], [place] - [address] - [phone] PH - [phone] FX  [address] - [phone] [facility] [phone] FX     Does the patient have less than a 3 day supply:  [x] Yes  [] No    Estephania GUTIERREZ Rep   09/15/22 14:18 EDT

## 2022-09-19 RX ORDER — TRAZODONE HYDROCHLORIDE 100 MG/1
TABLET ORAL
Qty: 30 TABLET | Refills: 10 | OUTPATIENT
Start: 2022-09-19

## 2022-09-21 RX ORDER — HYOSCYAMINE SULFATE 0.125 MG
TABLET ORAL
Qty: 120 TABLET | Refills: 10 | OUTPATIENT
Start: 2022-09-21

## 2022-09-21 RX ORDER — SUMATRIPTAN 50 MG/1
TABLET, FILM COATED ORAL
Qty: 9 TABLET | Refills: 10 | OUTPATIENT
Start: 2022-09-21

## 2022-09-22 RX ORDER — PANTOPRAZOLE SODIUM 40 MG/1
40 TABLET, DELAYED RELEASE ORAL DAILY
Qty: 30 TABLET | Refills: 10 | OUTPATIENT
Start: 2022-09-22

## 2022-09-22 RX ORDER — SUMATRIPTAN 50 MG/1
TABLET, FILM COATED ORAL
Qty: 9 TABLET | Refills: 10 | OUTPATIENT
Start: 2022-09-22

## 2022-09-22 RX ORDER — TOPIRAMATE 50 MG/1
TABLET, FILM COATED ORAL
Qty: 60 TABLET | Refills: 10 | OUTPATIENT
Start: 2022-09-22

## 2022-09-30 RX ORDER — PANTOPRAZOLE SODIUM 40 MG/1
40 TABLET, DELAYED RELEASE ORAL DAILY
Qty: 30 TABLET | Refills: 10 | OUTPATIENT
Start: 2022-09-30

## 2022-09-30 RX ORDER — HYOSCYAMINE SULFATE 0.125 MG
TABLET ORAL
Qty: 120 TABLET | Refills: 10 | OUTPATIENT
Start: 2022-09-30

## 2022-09-30 RX ORDER — TOPIRAMATE 50 MG/1
TABLET, FILM COATED ORAL
Qty: 60 TABLET | Refills: 10 | OUTPATIENT
Start: 2022-09-30

## 2022-10-12 RX ORDER — SUMATRIPTAN 50 MG/1
TABLET, FILM COATED ORAL
Qty: 9 TABLET | Refills: 10 | OUTPATIENT
Start: 2022-10-12

## 2022-10-12 NOTE — TELEPHONE ENCOUNTER
Caller: [facility], [place] - [address] [phone] [facility] [phone] FX    Relationship: Pharmacy    Best call back number: [phone]    Requested Prescriptions:   Requested Prescriptions     Pending Prescriptions Disp Refills   • SUMAtriptan (IMITREX) 50 MG tablet 9 tablet 10     Sig: TAKE 1 TABLET BY MOUTH AT ONSET OF HEADACHE, MAY REPEAT 1 TABLET IN 2 HOURS IF NEEDED        Pharmacy where request should be sent: [facility], [place] - [address] - [phone] [facility] [phone] FX       Does the patient have less than a 3 day supply:  [] Yes  [x] No    Estephania Whitfield Rep   10/12/22 10:11 EDT

## 2022-10-17 RX ORDER — PANTOPRAZOLE SODIUM 40 MG/1
40 TABLET, DELAYED RELEASE ORAL DAILY
Qty: 30 TABLET | Refills: 10 | OUTPATIENT
Start: 2022-10-17

## 2022-10-17 RX ORDER — HYOSCYAMINE SULFATE 0.125 MG
TABLET ORAL
Qty: 120 TABLET | Refills: 10 | OUTPATIENT
Start: 2022-10-17

## 2022-10-17 RX ORDER — SUMATRIPTAN 50 MG/1
TABLET, FILM COATED ORAL
Qty: 9 TABLET | Refills: 10 | OUTPATIENT
Start: 2022-10-17

## 2022-10-17 RX ORDER — TOPIRAMATE 50 MG/1
TABLET, FILM COATED ORAL
Qty: 60 TABLET | Refills: 10 | OUTPATIENT
Start: 2022-10-17

## 2023-02-16 RX ORDER — DICYCLOMINE HCL 20 MG
TABLET ORAL
Qty: 120 TABLET | Refills: 10 | OUTPATIENT
Start: 2023-02-16

## 2023-02-16 NOTE — TELEPHONE ENCOUNTER
Patient has not been seen since March 2021.  Must have appointment for physical for any further refills.

## 2023-02-22 RX ORDER — DICYCLOMINE HCL 20 MG
TABLET ORAL
Qty: 120 TABLET | Refills: 10 | OUTPATIENT
Start: 2023-02-22

## 2023-02-22 NOTE — TELEPHONE ENCOUNTER
Patient has not been seen since March 2021.  Must have appointment for physical for any additional refills

## 2023-02-28 RX ORDER — DICYCLOMINE HCL 20 MG
TABLET ORAL
Qty: 120 TABLET | Refills: 10 | OUTPATIENT
Start: 2023-02-28

## 2023-03-06 RX ORDER — DICYCLOMINE HCL 20 MG
TABLET ORAL
Qty: 120 TABLET | Refills: 10 | OUTPATIENT
Start: 2023-03-06

## 2023-03-10 RX ORDER — DICYCLOMINE HCL 20 MG
TABLET ORAL
Qty: 120 TABLET | Refills: 10 | OUTPATIENT
Start: 2023-03-10

## 2023-03-14 RX ORDER — DICYCLOMINE HCL 20 MG
TABLET ORAL
Qty: 120 TABLET | Refills: 10 | OUTPATIENT
Start: 2023-03-14

## 2023-03-17 RX ORDER — DICYCLOMINE HCL 20 MG
TABLET ORAL
Qty: 120 TABLET | Refills: 10 | OUTPATIENT
Start: 2023-03-17

## 2023-04-03 RX ORDER — DICYCLOMINE HCL 20 MG
TABLET ORAL
Qty: 120 TABLET | Refills: 10 | OUTPATIENT
Start: 2023-04-03

## 2023-04-12 RX ORDER — DICYCLOMINE HCL 20 MG
20 TABLET ORAL EVERY 6 HOURS
Qty: 120 TABLET | Refills: 10 | OUTPATIENT
Start: 2023-04-12

## 2023-04-12 NOTE — TELEPHONE ENCOUNTER
Caller: [facility], [place] - [address] [phone] [address] [place]    Relationship: Pharmacy      Requested Prescriptions:   Requested Prescriptions     Pending Prescriptions Disp Refills   • dicyclomine (BENTYL) 20 MG tablet 120 tablet 10     Sig: Take 1 tablet by mouth Every 6 (Six) Hours.        Pharmacy where request should be sent: [facility], [place] - [address] [phone] [address] [place]     Last office visit with prescribing clinician: Visit date not found   Last telemedicine visit with prescribing clinician: Visit date not found   Next office visit with prescribing clinician: Visit date not found         Does the patient have less than a 3 day supply:  [] Yes  [] No    Would you like a call back once the refill request has been completed: [] Yes [] No    If the office needs to give you a call back, can they leave a voicemail: [] Yes [] No    Estephania Nowak Rep   04/12/23 11:06 EDT

## 2023-05-19 RX ORDER — DICYCLOMINE HCL 20 MG
TABLET ORAL
Qty: 120 TABLET | Refills: 10 | OUTPATIENT
Start: 2023-05-19

## 2023-06-16 RX ORDER — SERTRALINE HYDROCHLORIDE 100 MG/1
TABLET, FILM COATED ORAL
Qty: 30 TABLET | Refills: 10 | OUTPATIENT
Start: 2023-06-16

## 2023-07-26 RX ORDER — SERTRALINE HYDROCHLORIDE 100 MG/1
TABLET, FILM COATED ORAL
Qty: 30 TABLET | Refills: 10 | OUTPATIENT
Start: 2023-07-26

## 2023-08-01 RX ORDER — SERTRALINE HYDROCHLORIDE 100 MG/1
TABLET, FILM COATED ORAL
Qty: 30 TABLET | Refills: 10 | OUTPATIENT
Start: 2023-08-01

## 2023-08-07 RX ORDER — SERTRALINE HYDROCHLORIDE 100 MG/1
TABLET, FILM COATED ORAL
Qty: 30 TABLET | Refills: 10 | OUTPATIENT
Start: 2023-08-07

## 2023-08-14 RX ORDER — SERTRALINE HYDROCHLORIDE 100 MG/1
TABLET, FILM COATED ORAL
Qty: 30 TABLET | Refills: 10 | OUTPATIENT
Start: 2023-08-14

## 2023-08-21 RX ORDER — SERTRALINE HYDROCHLORIDE 100 MG/1
TABLET, FILM COATED ORAL
Qty: 30 TABLET | Refills: 10 | OUTPATIENT
Start: 2023-08-21